# Patient Record
Sex: MALE | Race: BLACK OR AFRICAN AMERICAN | URBAN - METROPOLITAN AREA
[De-identification: names, ages, dates, MRNs, and addresses within clinical notes are randomized per-mention and may not be internally consistent; named-entity substitution may affect disease eponyms.]

---

## 2023-06-14 ENCOUNTER — OFFICE VISIT (OUTPATIENT)
Dept: URBAN - METROPOLITAN AREA CLINIC 105 | Facility: CLINIC | Age: 68
End: 2023-06-14
Payer: MEDICARE

## 2023-06-14 VITALS
HEIGHT: 71 IN | SYSTOLIC BLOOD PRESSURE: 100 MMHG | HEART RATE: 107 BPM | BODY MASS INDEX: 20.72 KG/M2 | DIASTOLIC BLOOD PRESSURE: 63 MMHG | WEIGHT: 148 LBS | TEMPERATURE: 96.6 F

## 2023-06-14 DIAGNOSIS — I71.40 ABDOMINAL AORTIC ANEURYSM (AAA) WITHOUT RUPTURE, UNSPECIFIED PART: ICD-10-CM

## 2023-06-14 DIAGNOSIS — Z98.890 HISTORY OF CARDIAC CATH: ICD-10-CM

## 2023-06-14 DIAGNOSIS — I10 PRIMARY HYPERTENSION: ICD-10-CM

## 2023-06-14 DIAGNOSIS — J44.1 COPD EXACERBATION: ICD-10-CM

## 2023-06-14 DIAGNOSIS — E78.49 OTHER HYPERLIPIDEMIA: ICD-10-CM

## 2023-06-14 DIAGNOSIS — K21.9 CHRONIC GERD: ICD-10-CM

## 2023-06-14 DIAGNOSIS — Z12.11 SCREENING FOR COLON CANCER: ICD-10-CM

## 2023-06-14 DIAGNOSIS — I25.10 CAD IN NATIVE ARTERY: ICD-10-CM

## 2023-06-14 DIAGNOSIS — F17.200 SMOKER: ICD-10-CM

## 2023-06-14 PROBLEM — 53741008: Status: ACTIVE | Noted: 2023-06-14

## 2023-06-14 PROBLEM — 235595009: Status: ACTIVE | Noted: 2023-06-14

## 2023-06-14 PROBLEM — 55822004: Status: ACTIVE | Noted: 2023-06-14

## 2023-06-14 PROBLEM — 77176002: Status: ACTIVE | Noted: 2023-06-14

## 2023-06-14 PROBLEM — 59621000: Status: ACTIVE | Noted: 2023-06-14

## 2023-06-14 PROBLEM — 195951007: Status: ACTIVE | Noted: 2023-06-14

## 2023-06-14 PROCEDURE — 99204 OFFICE O/P NEW MOD 45 MIN: CPT | Performed by: INTERNAL MEDICINE

## 2023-06-14 RX ORDER — FLUTICASONE PROPIONATE 50 UG/1
SPRAY, METERED NASAL
Qty: 16 | Status: ACTIVE | COMMUNITY

## 2023-06-14 RX ORDER — HYDROCHLOROTHIAZIDE 25 MG/1
TABLET ORAL
Qty: 30 TABLET | Status: ACTIVE | COMMUNITY

## 2023-06-14 RX ORDER — ATORVASTATIN CALCIUM 20 MG/1
TABLET, FILM COATED ORAL
Qty: 90 TABLET | Status: ACTIVE | COMMUNITY

## 2023-06-14 RX ORDER — SODIUM, POTASSIUM,MAG SULFATES 17.5-3.13G
177 ML SOLUTION, RECONSTITUTED, ORAL ORAL
Qty: 1 KIT | Refills: 0 | OUTPATIENT
Start: 2023-06-14 | End: 2023-06-15

## 2023-06-14 RX ORDER — ALBUTEROL SULFATE 90 UG/1
1 PUFF AS NEEDED AEROSOL, METERED RESPIRATORY (INHALATION)
Status: ACTIVE | COMMUNITY
Start: 2023-06-14

## 2023-06-14 RX ORDER — OMEPRAZOLE 40 MG/1
CAPSULE, DELAYED RELEASE PELLETS ORAL
Qty: 90 CAPSULE | Status: ACTIVE | COMMUNITY

## 2023-06-14 RX ORDER — UMECLIDINIUM BROMIDE AND VILANTEROL TRIFENATATE 62.5; 25 UG/1; UG/1
1 PUFF POWDER RESPIRATORY (INHALATION) ONCE A DAY
Status: ACTIVE | COMMUNITY
Start: 2023-06-14

## 2023-06-14 RX ORDER — DICLOFENAC SODIUM TOPICAL GEL, 1% 10 MG/G
AS DIRECTED GEL TOPICAL
Status: ACTIVE | COMMUNITY
Start: 2023-06-14

## 2023-06-14 RX ORDER — FENOFIBRATE 145 MG/1
TABLET, FILM COATED ORAL
Qty: 90 TABLET | Status: ACTIVE | COMMUNITY

## 2023-06-14 RX ORDER — BISACODYL 5 MG
TAKE 4 TABLET, DELAYED RELEASE (ENTERIC COATED) ORAL
Qty: 4 | OUTPATIENT
Start: 2023-06-14 | End: 2023-06-15

## 2023-06-14 NOTE — HPI-TODAY'S VISIT:
this patient comes to arrange an initial screening colonoscopy.  He tells me he has never before undergone colon cancer screening.  He denies change in bowel habits rectal bleeding or family history of colon cancer.  He does endorse a history of gastroesophageal reflux disease and is on PPI.  He does have COPD secondary to smoking and still smokes intermittently.  He further has history of coronary artery disease and in the past has undergone coronary stent placement.  He denies exertional chest pain.

## 2023-06-16 PROBLEM — 305058001: Status: ACTIVE | Noted: 2023-06-16

## 2023-09-22 ENCOUNTER — CLAIMS CREATED FROM THE CLAIM WINDOW (OUTPATIENT)
Dept: URBAN - METROPOLITAN AREA MEDICAL CENTER 33 | Facility: MEDICAL CENTER | Age: 68
End: 2023-09-22
Payer: MEDICARE

## 2023-09-22 ENCOUNTER — CLAIMS CREATED FROM THE CLAIM WINDOW (OUTPATIENT)
Dept: URBAN - METROPOLITAN AREA MEDICAL CENTER 33 | Facility: MEDICAL CENTER | Age: 68
End: 2023-09-22

## 2023-09-22 DIAGNOSIS — D12.2 ADENOMA OF ASCENDING COLON: ICD-10-CM

## 2023-09-22 DIAGNOSIS — K29.60 ADENOPAPILLOMATOSIS GASTRICA: ICD-10-CM

## 2023-09-22 DIAGNOSIS — Z12.11 COLON CANCER SCREENING: ICD-10-CM

## 2023-09-22 DIAGNOSIS — K20.80 ESOPHAGITIS DISSECANS SUPERFICIALIS: ICD-10-CM

## 2023-09-22 DIAGNOSIS — B96.81 BACTERIAL INFECTION DUE TO H. PYLORI: ICD-10-CM

## 2023-09-22 PROCEDURE — 45380 COLONOSCOPY AND BIOPSY: CPT | Performed by: INTERNAL MEDICINE

## 2023-09-22 PROCEDURE — 43239 EGD BIOPSY SINGLE/MULTIPLE: CPT | Performed by: INTERNAL MEDICINE

## 2023-09-22 RX ORDER — HYDROCHLOROTHIAZIDE 25 MG/1
TABLET ORAL
Qty: 30 TABLET | Status: ACTIVE | COMMUNITY

## 2023-09-22 RX ORDER — UMECLIDINIUM BROMIDE AND VILANTEROL TRIFENATATE 62.5; 25 UG/1; UG/1
1 PUFF POWDER RESPIRATORY (INHALATION) ONCE A DAY
Status: ACTIVE | COMMUNITY
Start: 2023-06-14

## 2023-09-22 RX ORDER — FLUTICASONE PROPIONATE 50 UG/1
SPRAY, METERED NASAL
Qty: 16 | Status: ACTIVE | COMMUNITY

## 2023-09-22 RX ORDER — ATORVASTATIN CALCIUM 20 MG/1
TABLET, FILM COATED ORAL
Qty: 90 TABLET | Status: ACTIVE | COMMUNITY

## 2023-09-22 RX ORDER — FENOFIBRATE 145 MG/1
TABLET, FILM COATED ORAL
Qty: 90 TABLET | Status: ACTIVE | COMMUNITY

## 2023-09-22 RX ORDER — ALBUTEROL SULFATE 90 UG/1
1 PUFF AS NEEDED AEROSOL, METERED RESPIRATORY (INHALATION)
Status: ACTIVE | COMMUNITY
Start: 2023-06-14

## 2023-09-22 RX ORDER — OMEPRAZOLE 40 MG/1
CAPSULE, DELAYED RELEASE PELLETS ORAL
Qty: 90 CAPSULE | Status: ACTIVE | COMMUNITY

## 2023-09-22 RX ORDER — DICLOFENAC SODIUM TOPICAL GEL, 1% 10 MG/G
AS DIRECTED GEL TOPICAL
Status: ACTIVE | COMMUNITY
Start: 2023-06-14

## 2023-10-04 ENCOUNTER — TELEPHONE ENCOUNTER (OUTPATIENT)
Dept: URBAN - METROPOLITAN AREA CLINIC 105 | Facility: CLINIC | Age: 68
End: 2023-10-04

## 2023-10-04 RX ORDER — OMEPRAZOLE MAGNESIUM, AMOXICILLIN AND RIFABUTIN 10; 250; 12.5 MG/1; MG/1; MG/1
4 CAPSULES CAPSULE, DELAYED RELEASE ORAL
Qty: 168 | OUTPATIENT
Start: 2023-10-10 | End: 2023-10-24

## 2023-10-12 ENCOUNTER — TELEPHONE ENCOUNTER (OUTPATIENT)
Dept: URBAN - METROPOLITAN AREA CLINIC 105 | Facility: CLINIC | Age: 68
End: 2023-10-12

## 2023-10-12 RX ORDER — BISMUTH SUBCITRATE POTASSIUM, METRONIDAZOLE, TETRACYCLINE HYDROCHLORIDE 140; 125; 125 MG/1; MG/1; MG/1
3 CAPSULES AFTER MEALS AND AT BEDTIME CAPSULE ORAL
Qty: 120 | OUTPATIENT
Start: 2023-10-12 | End: 2023-10-22

## 2023-12-05 ENCOUNTER — DASHBOARD ENCOUNTERS (OUTPATIENT)
Age: 68
End: 2023-12-05

## 2023-12-06 ENCOUNTER — OFFICE VISIT (OUTPATIENT)
Dept: URBAN - METROPOLITAN AREA CLINIC 105 | Facility: CLINIC | Age: 68
End: 2023-12-06